# Patient Record
Sex: MALE | Race: WHITE
[De-identification: names, ages, dates, MRNs, and addresses within clinical notes are randomized per-mention and may not be internally consistent; named-entity substitution may affect disease eponyms.]

---

## 2017-09-12 ENCOUNTER — HOSPITAL ENCOUNTER (OUTPATIENT)
Dept: HOSPITAL 24 - LAB | Age: 62
End: 2017-09-12
Attending: INTERNAL MEDICINE
Payer: COMMERCIAL

## 2017-09-12 DIAGNOSIS — K76.0: Primary | ICD-10-CM

## 2017-09-12 LAB
ALBUMIN SERPL BCP-MCNC: 3.3 G/DL (ref 3.4–5)
ALP SERPL-CCNC: 89 UNITS/L (ref 46–116)
ALT SERPL W P-5'-P-CCNC: 62 UNITS/L (ref 12–78)
AST SERPL W P-5'-P-CCNC: 46 UNITS/L (ref 15–37)
BILIRUB DIRECT SERPL-MCNC: 0.09 MG/DL (ref 0–0.2)
PROT SERPL-MCNC: 7.7 G/DL (ref 6.4–8.2)

## 2017-09-12 PROCEDURE — 80076 HEPATIC FUNCTION PANEL: CPT

## 2017-09-12 PROCEDURE — 36415 COLL VENOUS BLD VENIPUNCTURE: CPT

## 2018-01-02 ENCOUNTER — HOSPITAL ENCOUNTER (OUTPATIENT)
Dept: HOSPITAL 24 - LAB | Age: 63
Discharge: HOME | DRG: 443 | End: 2018-01-02
Attending: INTERNAL MEDICINE
Payer: COMMERCIAL

## 2018-01-02 DIAGNOSIS — R94.5: Primary | ICD-10-CM

## 2018-01-02 DIAGNOSIS — K76.0: ICD-10-CM

## 2018-01-02 LAB
ALBUMIN SERPL BCP-MCNC: 3.4 G/DL (ref 3.4–5)
ALP SERPL-CCNC: 88 UNITS/L (ref 46–116)
ALT SERPL W P-5'-P-CCNC: 51 UNITS/L (ref 12–78)
AST SERPL W P-5'-P-CCNC: 31 UNITS/L (ref 15–37)
BILIRUB DIRECT SERPL-MCNC: 0.09 MG/DL (ref 0–0.2)
PROT SERPL-MCNC: 7.9 G/DL (ref 6.4–8.2)

## 2018-01-02 PROCEDURE — 80076 HEPATIC FUNCTION PANEL: CPT

## 2018-01-02 PROCEDURE — 36415 COLL VENOUS BLD VENIPUNCTURE: CPT

## 2018-01-12 ENCOUNTER — HOSPITAL ENCOUNTER (OUTPATIENT)
Dept: HOSPITAL 24 - RAD | Age: 63
End: 2018-01-12
Attending: INTERNAL MEDICINE
Payer: COMMERCIAL

## 2018-01-12 DIAGNOSIS — K76.0: Primary | ICD-10-CM

## 2018-01-12 LAB
ALBUMIN SERPL BCP-MCNC: 3.2 G/DL (ref 3.4–5)
ALP SERPL-CCNC: 82 UNITS/L (ref 46–116)
ALT SERPL W P-5'-P-CCNC: 47 UNITS/L (ref 12–78)
AST SERPL W P-5'-P-CCNC: 25 UNITS/L (ref 15–37)
BILIRUB DIRECT SERPL-MCNC: 0.09 MG/DL (ref 0–0.2)
PROT SERPL-MCNC: 7.5 G/DL (ref 6.4–8.2)

## 2018-01-12 PROCEDURE — 76705 ECHO EXAM OF ABDOMEN: CPT

## 2018-01-12 PROCEDURE — 80076 HEPATIC FUNCTION PANEL: CPT

## 2018-01-12 PROCEDURE — 36415 COLL VENOUS BLD VENIPUNCTURE: CPT

## 2018-01-12 NOTE — US
HISTORY:  Fatty liver



Study:  Hepatic ultrasound:  Multiplanar ultrasonographic examination of the liver and right upper ab
dominal quadrant was performed.



Comparison:  None



Findings:  



On the images submitted to me the liver is of moderately increased echogenicity felt to be on the bas
is of moderate fatty infiltration.  Normal echogenicity is noted around the gallbladder fossa felt to
 be due to geographic fatty sparing.  No evidence of intrahepatic biliary duct dilatation is noted.  
The liver is of normal size.  The gallbladder shows no evidence of gallstones, pericholecystic fluid 
or gallbladder wall thickening.  Common bile duct is normal at 4.2 mm.  The pancreas could not be nelly
quately visualized.  The right kidney is of normal size, echogenicity and echotexture showing no evid
ence of mass or hydronephrosis.  Right kidney measures 13.8 cm in length by 6.3 x 6.1 cm.  No evidenc
e of ascites is noted in the right upper abdominal quadrant.  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 
collecting system.



IMPRESSION:

1.  Moderate fatty infiltration of the liver.

2.  Incomplete visualization of the pancreas,

3. Otherwise negative right upper quadrant ultrasound.







Reported By:Electronically Signed by EKELY HOLCOMB MD at 1/12/2018 11:31:55 AM